# Patient Record
Sex: MALE | Race: BLACK OR AFRICAN AMERICAN | Employment: FULL TIME | URBAN - METROPOLITAN AREA
[De-identification: names, ages, dates, MRNs, and addresses within clinical notes are randomized per-mention and may not be internally consistent; named-entity substitution may affect disease eponyms.]

---

## 2024-11-20 ENCOUNTER — OFFICE VISIT (OUTPATIENT)
Age: 54
End: 2024-11-20

## 2024-11-20 VITALS
SYSTOLIC BLOOD PRESSURE: 138 MMHG | HEIGHT: 71 IN | RESPIRATION RATE: 18 BRPM | WEIGHT: 233.6 LBS | BODY MASS INDEX: 32.7 KG/M2 | OXYGEN SATURATION: 96 % | DIASTOLIC BLOOD PRESSURE: 76 MMHG | HEART RATE: 96 BPM | TEMPERATURE: 98.2 F

## 2024-11-20 DIAGNOSIS — K04.7 TOOTH ABSCESS: Primary | ICD-10-CM

## 2024-11-20 RX ORDER — NAPROXEN 500 MG/1
500 TABLET ORAL 2 TIMES DAILY WITH MEALS
Qty: 30 TABLET | Refills: 0 | Status: SHIPPED | OUTPATIENT
Start: 2024-11-20

## 2024-11-20 RX ORDER — PENICILLIN V POTASSIUM 500 MG/1
500 TABLET, FILM COATED ORAL 4 TIMES DAILY
Qty: 28 TABLET | Refills: 0 | Status: SHIPPED | OUTPATIENT
Start: 2024-11-20 | End: 2024-11-27

## 2024-11-20 ASSESSMENT — ENCOUNTER SYMPTOMS
RHINORRHEA: 0
ABDOMINAL PAIN: 0
COUGH: 0
SORE THROAT: 0
DIARRHEA: 0
VOMITING: 0
FACIAL SWELLING: 1

## 2024-11-20 NOTE — PATIENT INSTRUCTIONS
New Prescriptions    NAPROXEN (NAPROSYN) 500 MG TABLET    Take 1 tablet by mouth 2 times daily (with meals)    PENICILLIN V POTASSIUM (VEETID) 500 MG TABLET    Take 1 tablet by mouth 4 times daily for 7 days      Take the antibiotic as prescribed.  Take tylenol and/or naproxen as needed for pain relief.  Use warm compresses to the affected site.  Encourage rest and increase fluid intake.  Follow-up with your PCP as needed.  Follow-up with your dentist as soon as possible.  Return for severe/worsening symptoms.

## 2024-11-20 NOTE — PROGRESS NOTES
Mauricio Mccray (:  1970) is a 54 y.o. male,New patient, here for evaluation of the following chief complaint(s):  Abscess (Dental abscess- bottom R side, started last night )      Assessment & Plan :  Visit Diagnoses and Associated Orders       Tooth abscess    -  Primary    naproxen (NAPROSYN) 500 MG tablet [5393]      penicillin v potassium (VEETID) 500 MG tablet [6093]           ORDERS WITHOUT AN ASSOCIATED DIAGNOSIS    UNABLE TO FIND [849408]      UNABLE TO FIND [262710]      UNABLE TO FIND [060256]            Differential diagnoses: tooth abscess, dental infection, strep pharyngitis, viral pharyngitis, gingivitis, ANUG     Plan: He appears to have an abscessed back right lower molar. He will be prescribed penicillin for the tooth infection. He was also prescribed naproxen for pain relief. He was advised to take tylenol in addition to the naproxen as needed for pain/fever relief. Encouraged to rest and increase fluid intake and advised to follow-up with his dentist as soon as possible. Return precautions discussed. Follow up in 3 days if symptoms persist or if symptoms worsen.       Subjective :  HPI  Mauricio Mccray is a 54 y.o. male who presents with complaints of a tooth infection. He reports that he started with pain to his right lower back molar area yesterday. He states that his pain has worsened and has had worsening swelling last night and today. He denies noting any drainage. He states that he does have a dentist but has not reached out to them yet. He denies any fevers. He states that he did an ice pack to the affected site and took tylenol with moderate improvement in the pain and swelling.        Vitals:    24 0844 24 0933   BP: (!) 151/109 138/76   Site: Right Upper Arm    Position: Sitting    Cuff Size: Large Adult    Pulse: 96    Resp: 18    Temp: 98.2 °F (36.8 °C)    TempSrc: Oral    SpO2: 96%    Weight: 106 kg (233 lb 9.6 oz)    Height: 1.803 m (5' 11\")         Review of